# Patient Record
Sex: MALE | Race: WHITE | NOT HISPANIC OR LATINO | Employment: FULL TIME | URBAN - NONMETROPOLITAN AREA
[De-identification: names, ages, dates, MRNs, and addresses within clinical notes are randomized per-mention and may not be internally consistent; named-entity substitution may affect disease eponyms.]

---

## 2023-12-28 ENCOUNTER — OFFICE VISIT (OUTPATIENT)
Dept: FAMILY MEDICINE CLINIC | Facility: CLINIC | Age: 43
End: 2023-12-28
Payer: COMMERCIAL

## 2023-12-28 VITALS
HEART RATE: 82 BPM | HEIGHT: 67 IN | RESPIRATION RATE: 16 BRPM | WEIGHT: 201.6 LBS | SYSTOLIC BLOOD PRESSURE: 132 MMHG | DIASTOLIC BLOOD PRESSURE: 82 MMHG | TEMPERATURE: 98 F | OXYGEN SATURATION: 98 % | BODY MASS INDEX: 31.64 KG/M2

## 2023-12-28 DIAGNOSIS — E66.09 CLASS 1 OBESITY DUE TO EXCESS CALORIES WITHOUT SERIOUS COMORBIDITY WITH BODY MASS INDEX (BMI) OF 31.0 TO 31.9 IN ADULT: ICD-10-CM

## 2023-12-28 DIAGNOSIS — Z00.00 WELL ADULT EXAM: Primary | ICD-10-CM

## 2023-12-28 DIAGNOSIS — Z28.21 VACCINATION DECLINED: ICD-10-CM

## 2023-12-28 DIAGNOSIS — R10.9 LEFT FLANK PAIN: ICD-10-CM

## 2023-12-28 DIAGNOSIS — Z11.59 NEED FOR HEPATITIS C SCREENING TEST: ICD-10-CM

## 2023-12-28 NOTE — PROGRESS NOTES
Male Physical Note      Date: 2023   Patient Name: Justo Baca  : 1980   MRN: [id number]     Chief Complaint:    Chief Complaint   Patient presents with    Establish Care     Pt has not had a PCP since his pediatrician. Dental is up to date vision is not.     Back Pain     Pt reports lower back pain  on the left side mainly in the morning. Radiates to the front abdomen and causes nausea.        History of Present Illness: Justo Baca is a 43 y.o. male who is here today for their annual health maintenance and physical.  Also reports flank pain that radiates to abdomen.  Occurs intermittently.  Stabbing pain.  Denies dysuria, hematuria, urinary discharge, dyspareunia, constipation,melena, hematochezia, fever or chills.  Denies any trauma or injury.  No history of renal calculi.  He does drink mostly soda and very little water.    Subjective      I have reviewed the following portions of the patient's history and these were updated and discussed with the patient as appropriate: past family history, past medical history, past social history, past surgical history, and problem list.      Medications:   No current outpatient medications on file.    Allergies:   No Known Allergies    Immunizations:    There is no immunization history on file for this patient.     Hep C ( 3676-3402): No     Diet/Physical activity: Poor per patient.  Eats lots of fast food.  Does not drink very much water.  Little exercise outside of work.    PHQ-9 Depression Screening  Little interest or pleasure in doing things? 0-->not at all   Feeling down, depressed, or hopeless? 0-->not at all   Trouble falling or staying asleep, or sleeping too much?     Feeling tired or having little energy?     Poor appetite or overeating?     Feeling bad about yourself - or that you are a failure or have let yourself or your family down?     Trouble concentrating on things, such as reading the newspaper or watching television?    "  Moving or speaking so slowly that other people could have noticed? Or the opposite - being so fidgety or restless that you have been moving around a lot more than usual?     Thoughts that you would be better off dead, or of hurting yourself in some way?     PHQ-9 Total Score 0   If you checked off any problems, how difficult have these problems made it for you to do your work, take care of things at home, or get along with other people?       Objective     Physical Exam:  Vital Signs:   Vitals:    12/28/23 1050   BP: 132/82   Pulse: 82   Resp: 16   Temp: 98 °F (36.7 °C)   TempSrc: Temporal   SpO2: 98%   Weight: 91.4 kg (201 lb 9.6 oz)   Height: 170.2 cm (67\")     Body mass index is 31.58 kg/m².     Physical Exam  Vitals reviewed.   Constitutional:       General: He is not in acute distress.     Appearance: Normal appearance. He is not ill-appearing.   HENT:      Head: Normocephalic and atraumatic.      Right Ear: Tympanic membrane, ear canal and external ear normal.      Left Ear: Tympanic membrane, ear canal and external ear normal.      Nose: Nose normal. No congestion or rhinorrhea.      Mouth/Throat:      Mouth: Mucous membranes are moist.      Pharynx: No oropharyngeal exudate or posterior oropharyngeal erythema.   Eyes:      General: No scleral icterus.        Right eye: No discharge.         Left eye: No discharge.      Extraocular Movements: Extraocular movements intact.      Conjunctiva/sclera: Conjunctivae normal.   Cardiovascular:      Rate and Rhythm: Normal rate and regular rhythm.      Heart sounds: Normal heart sounds. No murmur heard.  Pulmonary:      Effort: Pulmonary effort is normal. No respiratory distress.      Breath sounds: Normal breath sounds. No stridor. No wheezing or rales.   Abdominal:      General: Bowel sounds are normal. There is no distension.      Palpations: Abdomen is soft. There is no mass.      Tenderness: There is no abdominal tenderness. There is left CVA tenderness. There " is no right CVA tenderness, guarding or rebound.      Hernia: No hernia is present.   Musculoskeletal:         General: Normal range of motion.      Cervical back: Normal range of motion.      Right lower leg: No edema.      Left lower leg: No edema.   Skin:     General: Skin is warm and dry.   Neurological:      Mental Status: He is alert and oriented to person, place, and time. Mental status is at baseline.   Psychiatric:         Mood and Affect: Mood normal.         Behavior: Behavior normal.         Thought Content: Thought content normal.         Judgment: Judgment normal.         Procedures    Assessment / Plan      Assessment/Plan:   Diagnoses and all orders for this visit:    1. Well adult exam (Primary)  -     CBC (No Diff)  -     Comprehensive Metabolic Panel  -     Lipid Panel    2. Vaccination declined    3. Need for hepatitis C screening test  -     Hepatitis C Antibody    4. Class 1 obesity due to excess calories without serious comorbidity with body mass index (BMI) of 31.0 to 31.9 in adult    5. Left flank pain  -     CT Abdomen Pelvis With & Without Contrast; Future    Currently, he does not smoke or use tobacco    Reviewed care gaps and recommended screening tests with patient.  Patient needs Hepatitis C screening, ordered today  Immunizations reviewed.  Declines flu vaccine today  Reviewed chronic conditions.  Patient following with appropriate specialists, if needed, with follow-up appointments scheduled.  Vital signs demonstrate hemodynamic stability  I suspect that flank pain is secondary to renal calculi.  Will check CT abdomen pelvis.  Consider Flomax if positive.  Pain mostly relieved with OTC analgesics at this time, but would consider opioid analgesia if pain worsens.  Advised patient to drink lots of water and to strain urine    BMI is >= 30 and <35. (Class 1 Obesity). The following options were offered after discussion;: Information on healthy weight added to patient's after visit  summary.       Follow Up:   Return in about 2 weeks (around 1/11/2024) for Flank pain.    Healthcare Maintenance:   Counseling provided on exercise, nutrition, age-appropriate screening test, and appropriate lab studies.   Justo Baca voices understanding and acceptance of this advice and will call back with any further questions or concerns. AVS with preventive healthcare tips printed for patient.     Colton Gil MD   [facility] [place] [facility]

## 2023-12-29 ENCOUNTER — HOSPITAL ENCOUNTER (OUTPATIENT)
Dept: CT IMAGING | Facility: HOSPITAL | Age: 43
Discharge: HOME | End: 2023-12-29
Admitting: FAMILY MEDICINE
Payer: COMMERCIAL

## 2023-12-29 ENCOUNTER — PATIENT ROUNDING (BHMG ONLY) (OUTPATIENT)
Dept: FAMILY MEDICINE CLINIC | Facility: CLINIC | Age: 43
End: 2023-12-29
Payer: COMMERCIAL

## 2023-12-29 DIAGNOSIS — R10.9 LEFT FLANK PAIN: ICD-10-CM

## 2023-12-29 LAB
ALBUMIN SERPL-MCNC: 4.4 G/DL (ref 4.1–5.1)
ALBUMIN/GLOB SERPL: 2.1 {RATIO} (ref 1.2–2.2)
ALP SERPL-CCNC: 53 IU/L (ref 44–121)
ALT SERPL-CCNC: 29 IU/L (ref 0–44)
AST SERPL-CCNC: 21 IU/L (ref 0–40)
BILIRUB SERPL-MCNC: 0.4 MG/DL (ref 0–1.2)
BUN SERPL-MCNC: 9 MG/DL (ref 6–24)
BUN/CREAT SERPL: 9 (ref 9–20)
CALCIUM SERPL-MCNC: 9.1 MG/DL (ref 8.7–10.2)
CHLORIDE SERPL-SCNC: 104 MMOL/L (ref 96–106)
CHOLEST SERPL-MCNC: 234 MG/DL (ref 100–199)
CO2 SERPL-SCNC: 23 MMOL/L (ref 20–29)
CREAT SERPL-MCNC: 0.95 MG/DL (ref 0.76–1.27)
EGFRCR SERPLBLD CKD-EPI 2021: 103 ML/MIN/1.73
ERYTHROCYTE [DISTWIDTH] IN BLOOD BY AUTOMATED COUNT: 12.7 % (ref 11.6–15.4)
GLOBULIN SER CALC-MCNC: 2.1 G/DL (ref 1.5–4.5)
GLUCOSE SERPL-MCNC: 114 MG/DL (ref 70–99)
HCT VFR BLD AUTO: 45.5 % (ref 37.5–51)
HCV IGG SERPL QL IA: NON REACTIVE
HDLC SERPL-MCNC: 36 MG/DL
HGB BLD-MCNC: 15.3 G/DL (ref 13–17.7)
LDLC SERPL CALC-MCNC: 145 MG/DL (ref 0–99)
MCH RBC QN AUTO: 30.3 PG (ref 26.6–33)
MCHC RBC AUTO-ENTMCNC: 33.6 G/DL (ref 31.5–35.7)
MCV RBC AUTO: 90 FL (ref 79–97)
PLATELET # BLD AUTO: 294 X10E3/UL (ref 150–450)
POTASSIUM SERPL-SCNC: 4.1 MMOL/L (ref 3.5–5.2)
PROT SERPL-MCNC: 6.5 G/DL (ref 6–8.5)
RBC # BLD AUTO: 5.05 X10E6/UL (ref 4.14–5.8)
SODIUM SERPL-SCNC: 141 MMOL/L (ref 134–144)
TRIGL SERPL-MCNC: 288 MG/DL (ref 0–149)
VLDLC SERPL CALC-MCNC: 53 MG/DL (ref 5–40)
WBC # BLD AUTO: 6.2 X10E3/UL (ref 3.4–10.8)

## 2023-12-29 PROCEDURE — 74178 CT ABD&PLV WO CNTR FLWD CNTR: CPT

## 2023-12-29 PROCEDURE — 25510000001 IOPAMIDOL 61 % SOLUTION: Performed by: FAMILY MEDICINE

## 2023-12-29 RX ADMIN — IOPAMIDOL 100 ML: 612 INJECTION, SOLUTION INTRAVENOUS at 18:10

## 2024-01-02 ENCOUNTER — TELEPHONE (OUTPATIENT)
Dept: FAMILY MEDICINE CLINIC | Facility: CLINIC | Age: 44
End: 2024-01-02

## 2024-01-02 NOTE — TELEPHONE ENCOUNTER
"  Caller: Justo Baca \"Juan Pablo\"    Relationship: Self    Best call back number:  [phone]     Who are you requesting to speak with (clinical staff, provider,  specific staff member): DR PEREZ OR CLINICAL      What was the call regarding: CT AND LAB RESULTS. PATIENT IS CONCERNED ABOUT NEXT STEPS OF THE KIDNEY STONE. PLEASE ADVISE ASAP.  THANK YOU.        "

## 2024-01-03 DIAGNOSIS — N20.0 RENAL CALCULI: Primary | ICD-10-CM

## 2024-01-03 DIAGNOSIS — R10.9 LEFT FLANK PAIN: ICD-10-CM

## 2024-01-10 ENCOUNTER — OFFICE VISIT (OUTPATIENT)
Dept: UROLOGY | Facility: CLINIC | Age: 44
End: 2024-01-10
Payer: COMMERCIAL

## 2024-01-10 VITALS — BODY MASS INDEX: 31.39 KG/M2 | HEIGHT: 67 IN | OXYGEN SATURATION: 98 % | WEIGHT: 200 LBS | HEART RATE: 80 BPM

## 2024-01-10 DIAGNOSIS — N20.0 NEPHROLITHIASIS: Primary | ICD-10-CM

## 2024-01-10 PROCEDURE — 99204 OFFICE O/P NEW MOD 45 MIN: CPT | Performed by: UROLOGY

## 2024-01-10 PROCEDURE — 87086 URINE CULTURE/COLONY COUNT: CPT | Performed by: UROLOGY

## 2024-01-10 RX ORDER — GABAPENTIN 100 MG/1
600 CAPSULE ORAL ONCE
OUTPATIENT
Start: 2024-01-10 | End: 2024-01-10

## 2024-01-10 RX ORDER — ACETAMINOPHEN 500 MG
1000 TABLET ORAL ONCE
OUTPATIENT
Start: 2024-01-10 | End: 2024-01-10

## 2024-01-10 RX ORDER — SCOLOPAMINE TRANSDERMAL SYSTEM 1 MG/1
1 PATCH, EXTENDED RELEASE TRANSDERMAL CONTINUOUS
OUTPATIENT
Start: 2024-01-10 | End: 2024-01-13

## 2024-01-10 NOTE — PROGRESS NOTES
Office Note Kidney Stone      Patient Name: Justo Baca  : 1980   MRN: [id number]     Chief Complaint: History of Nephrolithiasis/Ureterolithiasis     Referring Provider: Colton Gil MD    History of Present Illness: Justo Baca is a 43 y.o. male who presents today for recently diagnosed kidney stone. He was seen by his PCP for some flank pain on 23 at which time a CT scan was completed showing a 6.5cm left UPJ stone with no additional stones. There is no dilation on scan. He is having intermittent flank pain still.    Stone related history  Family history of stones:   no  Renal disease or anatomic abnormality: no  Malabsorptive disease or gastric bypass: no  Frequent UTI's    no  Parathyroid disease    no        Subjective      Review of System: Review of Systems   Genitourinary:  Negative for decreased urine volume, difficulty urinating, dysuria, enuresis, flank pain, frequency, hematuria and urgency.        I have reviewed the ROS documented by my clinical staff, updated as appropriate and I agree. Saúl Vital MD    Past Medical History:   Past Medical History:   Diagnosis Date    Visual impairment 5 years ago       Past Surgical History:   Past Surgical History:   Procedure Laterality Date    LEG SURGERY Right        Family History:   Family History   Problem Relation Age of Onset    Arthritis Mother     Diabetes Mother     No Known Problems Father     Arthritis Maternal Grandmother     Cancer Maternal Grandmother     Heart disease Maternal Grandmother     Hyperlipidemia Maternal Grandmother     Kidney disease Maternal Grandmother        Social History:   Social History     Socioeconomic History    Marital status:    Tobacco Use    Smoking status: Never     Passive exposure: Never    Smokeless tobacco: Never   Vaping Use    Vaping Use: Never used   Substance and Sexual Activity    Alcohol use: Never    Drug use: Never    Sexual activity: Yes     Partners: Female     " Birth control/protection: Condom       Medications:   No current outpatient medications on file.    Allergies:   No Known Allergies    Objective     Physical Exam:   Vital Signs:   Vitals:    01/10/24 1401   Pulse: 80   SpO2: 98%   Weight: 90.7 kg (200 lb)   Height: 170.2 cm (67.01\")   PainSc: 0-No pain     Body mass index is 31.32 kg/m².     Physical Exam  Vitals and nursing note reviewed.   Constitutional:       Appearance: Normal appearance.   HENT:      Head: Normocephalic and atraumatic.   Cardiovascular:      Comments: Well perfused  Pulmonary:      Effort: Pulmonary effort is normal.   Abdominal:      General: Abdomen is flat.      Palpations: Abdomen is soft.   Musculoskeletal:         General: Normal range of motion.   Skin:     General: Skin is warm and dry.   Neurological:      General: No focal deficit present.      Mental Status: He is alert and oriented to person, place, and time. Mental status is at baseline.   Psychiatric:         Mood and Affect: Mood normal.         Behavior: Behavior normal.         Thought Content: Thought content normal.         Judgment: Judgment normal.         Labs:   Brief Urine Lab Results       None                 Lab Results   Component Value Date    GLUCOSE 114 (H) 12/28/2023    CALCIUM 9.1 12/28/2023     12/28/2023    K 4.1 12/28/2023    CO2 23 12/28/2023     12/28/2023    BUN 9 12/28/2023    CREATININE 0.95 12/28/2023    BCR 9 12/28/2023       Lab Results   Component Value Date    WBC 6.2 12/28/2023    HGB 15.3 12/28/2023    HCT 45.5 12/28/2023    MCV 90 12/28/2023     12/28/2023         Images:   CT Abdomen Pelvis With & Without Contrast    Result Date: 12/29/2023  Nonobstructing stone in the left renal pelvis.  No acute disease. Authenticated and Electronically Signed by Grey Odell M.D. on 12/29/2023 08:54:58 PM      Measures:   Tobacco:   Justo Barnarddridge  reports that he has never smoked. He has never been exposed to tobacco smoke. He has " never used smokeless tobacco.    Assessment / Plan      Assessment/Plan:   Justo Baca is a 43 y.o. male who presented today with nephrolithiasis. He is having intermittent flank pain still. He is now interested in a procedure. We discussed ESWL vs ureteroscopy, and the risks and benefits of each. He is leaning towards ESWL, so we will get him scheduled for this and transition to ureteroscopy if he changes his mind. We collected a urine culture today in case he would like to change to a ureteroscopy.    Diagnoses and all orders for this visit:    1. Nephrolithiasis (Primary)  -     acetaminophen (TYLENOL) tablet 1,000 mg  -     gabapentin (NEURONTIN) capsule 600 mg  -     scopolamine patch 1 mg/72 hr  -     Case Request; Standing  -     ceFAZolin (ANCEF) 2,000 mg in sodium chloride 0.9 % 100 mL IVPB  -     Case Request  -     Urine Culture - Urine, Urine, Clean Catch    Other orders  -     Follow Anesthesia Guidelines / Protocol; Standing  -     Provide Patient With Enhanced Recovery Booklet(s) OR Handout; Standing  -     Verify NPO Status; Standing  -     Verify the Time Patient Completed Gatorade / G2; Standing  -     Obtain Informed Consent; Standing           I spent approximately 45 minutes providing clinical care for this patient; including review of patient's chart and provider documentation, face to face time spent with patient in examination room (obtaining history, performing physical exam, discussing diagnosis and management options), placing orders, and completing patient documentation.     Saúl Vital MD  [facility] [place]

## 2024-01-11 LAB — BACTERIA SPEC AEROBE CULT: NO GROWTH

## 2024-01-15 ENCOUNTER — OFFICE VISIT (OUTPATIENT)
Dept: FAMILY MEDICINE CLINIC | Facility: CLINIC | Age: 44
End: 2024-01-15
Payer: COMMERCIAL

## 2024-01-15 VITALS
BODY MASS INDEX: 32.18 KG/M2 | WEIGHT: 205 LBS | OXYGEN SATURATION: 97 % | HEART RATE: 79 BPM | HEIGHT: 67 IN | DIASTOLIC BLOOD PRESSURE: 82 MMHG | SYSTOLIC BLOOD PRESSURE: 120 MMHG

## 2024-01-15 DIAGNOSIS — E66.09 CLASS 1 OBESITY DUE TO EXCESS CALORIES WITHOUT SERIOUS COMORBIDITY WITH BODY MASS INDEX (BMI) OF 31.0 TO 31.9 IN ADULT: ICD-10-CM

## 2024-01-15 DIAGNOSIS — R73.9 HYPERGLYCEMIA: ICD-10-CM

## 2024-01-15 DIAGNOSIS — E78.2 MIXED HYPERLIPIDEMIA: Primary | ICD-10-CM

## 2024-01-15 PROCEDURE — 99213 OFFICE O/P EST LOW 20 MIN: CPT | Performed by: FAMILY MEDICINE

## 2024-01-15 NOTE — PROGRESS NOTES
"    Office Note     Name: Justo Baca  : 1980   MRN: [id number]     Chief Complaint:  Flank Pain (2 week follow up for flank pain. Patient states that he has not had any issues, but did see urology and is scheduled for surgery on 24. Would like to discuss getting blood work redone.)    Subjective     History of Present Illness:  Justo Baca is a 43 y.o. male who presents today for flank pain f/u and to discuss recent labwork.     Patient had lipid panel and annual exam and lab work.  Lipid panel demonstrated dyslipidemia and CMP demonstrated hyperglycemia.  Patient does have some family history of early CAD on his grandmother side.  He also has history of diabetes in his mother and brother.  Denies urinary frequency or urgency at this time.  He did follow-up with urology after CT showed 6.5 mm stone.  Plan per patient is for procedure in 2 weeks.    I have reviewed CMP as part of the following portions of the patient's history and these were updated and discussed with the patient as appropriate: past family history, past medical history, past social history, past surgical history, and problem list.     Objective     Vital Signs  /82   Pulse 79   Ht 170.2 cm (67.01\")   Wt 93 kg (205 lb)   SpO2 97%   BMI 32.10 kg/m²   Estimated body mass index is 32.1 kg/m² as calculated from the following:    Height as of this encounter: 170.2 cm (67.01\").    Weight as of this encounter: 93 kg (205 lb).    Physical Exam  Vitals reviewed.   Constitutional:       General: He is not in acute distress.     Appearance: Normal appearance. He is obese. He is not ill-appearing.   HENT:      Head: Normocephalic.   Eyes:      Extraocular Movements: Extraocular movements intact.   Cardiovascular:      Rate and Rhythm: Normal rate.   Pulmonary:      Effort: Pulmonary effort is normal. No respiratory distress.      Breath sounds: Normal breath sounds.   Neurological:      Mental Status: He is alert and " oriented to person, place, and time.   Psychiatric:         Mood and Affect: Mood normal.         Behavior: Behavior normal.                      Assessment and Plan     Diagnoses and all orders for this visit:    1. Mixed hyperlipidemia (Primary)  -     Lipid panel; Future    2. Hyperglycemia  -     Basic metabolic panel; Future    3. Class 1 obesity due to excess calories without serious comorbidity with body mass index (BMI) of 31.0 to 31.9 in adult      ASCVD risk is low at 2.4%. Statin medication not indicated. TG level not high enough to warrant immediate medication management either. Will start lifestyle modifications  Discussed dietary changes including low-carb diet.  Discussed increasing exercise.   Will recheck fasting lipid panel and BMP in 3 months.    BMI is >= 30 and <35. (Class 1 Obesity). The following options were offered after discussion;: exercise counseling/recommendations and nutrition counseling/recommendations         Discussion Summary:     Discussed plan of care in detail with patient today. Patient was encouraged to keep me informed of any acute changes, lack of improvement, or any new concerning symptoms.  Patient is also aware of reasons to seek emergent care. Patient verbalized understanding and agrees with plan of care.    This visit was billed based on [facility].    Follow Up  Return in about 3 months (around 4/15/2024) for Recheck hyperlipidemia.    Please note that portions of this note may have been completed with a voice recognition program.     Colton Gil MD, MPH  [facility] [place] [facility] [place]

## 2024-01-26 RX ORDER — IBUPROFEN 200 MG
400 TABLET ORAL EVERY 6 HOURS PRN
COMMUNITY

## 2024-01-26 RX ORDER — LOPERAMIDE HYDROCHLORIDE 2 MG/1
2 CAPSULE ORAL 4 TIMES DAILY PRN
COMMUNITY

## 2024-01-28 ENCOUNTER — ANESTHESIA EVENT (OUTPATIENT)
Dept: PERIOP | Facility: HOSPITAL | Age: 44
End: 2024-01-28
Payer: COMMERCIAL

## 2024-01-28 RX ORDER — SODIUM CHLORIDE 0.9 % (FLUSH) 0.9 %
10 SYRINGE (ML) INJECTION EVERY 12 HOURS SCHEDULED
Status: CANCELLED | OUTPATIENT
Start: 2024-01-28

## 2024-01-28 RX ORDER — SODIUM CHLORIDE 9 MG/ML
40 INJECTION, SOLUTION INTRAVENOUS AS NEEDED
Status: CANCELLED | OUTPATIENT
Start: 2024-01-28

## 2024-01-29 ENCOUNTER — TELEPHONE (OUTPATIENT)
Dept: UROLOGY | Facility: CLINIC | Age: 44
End: 2024-01-29

## 2024-01-29 ENCOUNTER — ANESTHESIA (OUTPATIENT)
Dept: PERIOP | Facility: HOSPITAL | Age: 44
End: 2024-01-29
Payer: COMMERCIAL

## 2024-01-29 ENCOUNTER — HOSPITAL ENCOUNTER (OUTPATIENT)
Facility: HOSPITAL | Age: 44
Setting detail: HOSPITAL OUTPATIENT SURGERY
Discharge: HOME | End: 2024-01-29
Attending: UROLOGY | Admitting: UROLOGY
Payer: COMMERCIAL

## 2024-01-29 VITALS
HEIGHT: 67 IN | SYSTOLIC BLOOD PRESSURE: 125 MMHG | HEART RATE: 74 BPM | DIASTOLIC BLOOD PRESSURE: 81 MMHG | WEIGHT: 202 LBS | RESPIRATION RATE: 16 BRPM | TEMPERATURE: 98.5 F | OXYGEN SATURATION: 95 % | BODY MASS INDEX: 31.71 KG/M2

## 2024-01-29 DIAGNOSIS — N20.0 NEPHROLITHIASIS: ICD-10-CM

## 2024-01-29 PROCEDURE — 50590 FRAGMENTING OF KIDNEY STONE: CPT | Performed by: UROLOGY

## 2024-01-29 PROCEDURE — 25010000002 FENTANYL CITRATE (PF) 100 MCG/2ML SOLUTION

## 2024-01-29 PROCEDURE — 25810000003 LACTATED RINGERS PER 1000 ML: Performed by: ANESTHESIOLOGY

## 2024-01-29 PROCEDURE — 25010000002 ONDANSETRON PER 1 MG

## 2024-01-29 PROCEDURE — 25010000002 PROPOFOL 10 MG/ML EMULSION

## 2024-01-29 PROCEDURE — 25010000002 DEXAMETHASONE PER 1 MG

## 2024-01-29 PROCEDURE — 25010000002 CEFAZOLIN PER 500 MG: Performed by: UROLOGY

## 2024-01-29 RX ORDER — FAMOTIDINE 10 MG/ML
20 INJECTION, SOLUTION INTRAVENOUS ONCE
Status: DISCONTINUED | OUTPATIENT
Start: 2024-01-29 | End: 2024-01-29 | Stop reason: HOSPADM

## 2024-01-29 RX ORDER — HYDROMORPHONE HYDROCHLORIDE 1 MG/ML
0.5 INJECTION, SOLUTION INTRAMUSCULAR; INTRAVENOUS; SUBCUTANEOUS
Status: DISCONTINUED | OUTPATIENT
Start: 2024-01-29 | End: 2024-01-29 | Stop reason: HOSPADM

## 2024-01-29 RX ORDER — METHOCARBAMOL 500 MG/1
500 TABLET, FILM COATED ORAL 3 TIMES DAILY
Qty: 15 TABLET | Refills: 0 | Status: SHIPPED | OUTPATIENT
Start: 2024-01-29

## 2024-01-29 RX ORDER — SODIUM CHLORIDE 0.9 % (FLUSH) 0.9 %
10 SYRINGE (ML) INJECTION AS NEEDED
Status: DISCONTINUED | OUTPATIENT
Start: 2024-01-29 | End: 2024-01-29 | Stop reason: HOSPADM

## 2024-01-29 RX ORDER — OXYBUTYNIN CHLORIDE 5 MG/1
5 TABLET, EXTENDED RELEASE ORAL DAILY
Qty: 14 TABLET | Refills: 0 | Status: SHIPPED | OUTPATIENT
Start: 2024-01-29

## 2024-01-29 RX ORDER — SODIUM CHLORIDE, SODIUM LACTATE, POTASSIUM CHLORIDE, CALCIUM CHLORIDE 600; 310; 30; 20 MG/100ML; MG/100ML; MG/100ML; MG/100ML
9 INJECTION, SOLUTION INTRAVENOUS CONTINUOUS
Status: DISCONTINUED | OUTPATIENT
Start: 2024-01-29 | End: 2024-01-29 | Stop reason: HOSPADM

## 2024-01-29 RX ORDER — MIDAZOLAM HYDROCHLORIDE 1 MG/ML
1 INJECTION INTRAMUSCULAR; INTRAVENOUS
Status: DISCONTINUED | OUTPATIENT
Start: 2024-01-29 | End: 2024-01-29 | Stop reason: HOSPADM

## 2024-01-29 RX ORDER — LIDOCAINE HYDROCHLORIDE 10 MG/ML
0.5 INJECTION, SOLUTION EPIDURAL; INFILTRATION; INTRACAUDAL; PERINEURAL ONCE AS NEEDED
Status: COMPLETED | OUTPATIENT
Start: 2024-01-29 | End: 2024-01-29

## 2024-01-29 RX ORDER — LIDOCAINE HYDROCHLORIDE 10 MG/ML
INJECTION, SOLUTION EPIDURAL; INFILTRATION; INTRACAUDAL; PERINEURAL AS NEEDED
Status: DISCONTINUED | OUTPATIENT
Start: 2024-01-29 | End: 2024-01-29 | Stop reason: SURG

## 2024-01-29 RX ORDER — GABAPENTIN 300 MG/1
600 CAPSULE ORAL ONCE
Status: COMPLETED | OUTPATIENT
Start: 2024-01-29 | End: 2024-01-29

## 2024-01-29 RX ORDER — FENTANYL CITRATE 50 UG/ML
50 INJECTION, SOLUTION INTRAMUSCULAR; INTRAVENOUS
Status: DISCONTINUED | OUTPATIENT
Start: 2024-01-29 | End: 2024-01-29 | Stop reason: HOSPADM

## 2024-01-29 RX ORDER — TAMSULOSIN HYDROCHLORIDE 0.4 MG/1
1 CAPSULE ORAL DAILY
Qty: 14 CAPSULE | Refills: 0 | Status: SHIPPED | OUTPATIENT
Start: 2024-01-29 | End: 2024-02-12

## 2024-01-29 RX ORDER — KETOROLAC TROMETHAMINE 10 MG/1
10 TABLET, FILM COATED ORAL EVERY 6 HOURS PRN
Qty: 15 TABLET | Refills: 0 | Status: SHIPPED | OUTPATIENT
Start: 2024-01-29

## 2024-01-29 RX ORDER — FAMOTIDINE 20 MG/1
20 TABLET, FILM COATED ORAL ONCE
Status: COMPLETED | OUTPATIENT
Start: 2024-01-29 | End: 2024-01-29

## 2024-01-29 RX ORDER — NITROFURANTOIN 25; 75 MG/1; MG/1
100 CAPSULE ORAL 2 TIMES DAILY
Qty: 14 CAPSULE | Refills: 0 | Status: SHIPPED | OUTPATIENT
Start: 2024-01-29

## 2024-01-29 RX ORDER — PHENAZOPYRIDINE HYDROCHLORIDE 200 MG/1
200 TABLET, FILM COATED ORAL 3 TIMES DAILY PRN
Qty: 20 TABLET | Refills: 0 | Status: SHIPPED | OUTPATIENT
Start: 2024-01-29

## 2024-01-29 RX ORDER — ONDANSETRON 2 MG/ML
INJECTION INTRAMUSCULAR; INTRAVENOUS AS NEEDED
Status: DISCONTINUED | OUTPATIENT
Start: 2024-01-29 | End: 2024-01-29 | Stop reason: SURG

## 2024-01-29 RX ORDER — PROPOFOL 10 MG/ML
VIAL (ML) INTRAVENOUS AS NEEDED
Status: DISCONTINUED | OUTPATIENT
Start: 2024-01-29 | End: 2024-01-29 | Stop reason: SURG

## 2024-01-29 RX ORDER — SCOLOPAMINE TRANSDERMAL SYSTEM 1 MG/1
1 PATCH, EXTENDED RELEASE TRANSDERMAL CONTINUOUS
Status: DISCONTINUED | OUTPATIENT
Start: 2024-01-29 | End: 2024-01-29 | Stop reason: HOSPADM

## 2024-01-29 RX ORDER — FENTANYL CITRATE 50 UG/ML
INJECTION, SOLUTION INTRAMUSCULAR; INTRAVENOUS AS NEEDED
Status: DISCONTINUED | OUTPATIENT
Start: 2024-01-29 | End: 2024-01-29 | Stop reason: SURG

## 2024-01-29 RX ORDER — DEXAMETHASONE SODIUM PHOSPHATE 4 MG/ML
INJECTION, SOLUTION INTRA-ARTICULAR; INTRALESIONAL; INTRAMUSCULAR; INTRAVENOUS; SOFT TISSUE AS NEEDED
Status: DISCONTINUED | OUTPATIENT
Start: 2024-01-29 | End: 2024-01-29 | Stop reason: SURG

## 2024-01-29 RX ORDER — ACETAMINOPHEN 500 MG
1000 TABLET ORAL ONCE
Status: COMPLETED | OUTPATIENT
Start: 2024-01-29 | End: 2024-01-29

## 2024-01-29 RX ADMIN — FENTANYL CITRATE 25 MCG: 50 INJECTION, SOLUTION INTRAMUSCULAR; INTRAVENOUS at 12:36

## 2024-01-29 RX ADMIN — LIDOCAINE HYDROCHLORIDE 0.5 ML: 10 INJECTION, SOLUTION EPIDURAL; INFILTRATION; INTRACAUDAL; PERINEURAL at 12:04

## 2024-01-29 RX ADMIN — SODIUM CHLORIDE 2000 MG: 900 INJECTION INTRAVENOUS at 12:39

## 2024-01-29 RX ADMIN — FAMOTIDINE 20 MG: 20 TABLET ORAL at 12:04

## 2024-01-29 RX ADMIN — SODIUM CHLORIDE, POTASSIUM CHLORIDE, SODIUM LACTATE AND CALCIUM CHLORIDE 9 ML/HR: 600; 310; 30; 20 INJECTION, SOLUTION INTRAVENOUS at 12:04

## 2024-01-29 RX ADMIN — ACETAMINOPHEN 1000 MG: 500 TABLET ORAL at 12:04

## 2024-01-29 RX ADMIN — LIDOCAINE HYDROCHLORIDE 50 MG: 10 SOLUTION INTRAVENOUS at 12:36

## 2024-01-29 RX ADMIN — ONDANSETRON 4 MG: 2 INJECTION INTRAMUSCULAR; INTRAVENOUS at 12:42

## 2024-01-29 RX ADMIN — DEXAMETHASONE SODIUM PHOSPHATE 4 MG: 4 INJECTION, SOLUTION INTRAMUSCULAR; INTRAVENOUS at 12:42

## 2024-01-29 RX ADMIN — SCOPOLAMINE 1 PATCH: 1.5 PATCH, EXTENDED RELEASE TRANSDERMAL at 12:04

## 2024-01-29 RX ADMIN — FENTANYL CITRATE 75 MCG: 50 INJECTION, SOLUTION INTRAMUSCULAR; INTRAVENOUS at 12:38

## 2024-01-29 RX ADMIN — GABAPENTIN 600 MG: 300 CAPSULE ORAL at 12:04

## 2024-01-29 RX ADMIN — PROPOFOL 200 MG: 10 INJECTION, EMULSION INTRAVENOUS at 12:36

## 2024-01-29 NOTE — TELEPHONE ENCOUNTER
PT returned call to office, and asked what his return date was on forms. It was noted that he could go back to work the day after his surgery. Because of his job, he spoke to Dr. Vital before procedure, and Dr. Vital said he could return to work when he felt well enough to perform job, so he will call back with that date to be added later and will pay fee then as well.

## 2024-01-29 NOTE — ANESTHESIA PROCEDURE NOTES
Airway  Urgency: elective    Date/Time: 1/29/2024 12:37 PM  Airway not difficult    General Information and Staff    Patient location during procedure: OR  CRNA/CAA: Oscar Zarate CRNA    Indications and Patient Condition  Indications for airway management: airway protection    Preoxygenated: yes  Mask difficulty assessment: 0 - not attempted    Final Airway Details  Final airway type: supraglottic airway      Successful airway: I-gel  Size 4     Number of attempts at approach: 1  Assessment: lips, teeth, and gum same as pre-op    Additional Comments  LMA placed without difficulty, ventilation with assist, equal breath sounds and symmetric chest rise and fall

## 2024-01-29 NOTE — ANESTHESIA PREPROCEDURE EVALUATION
Anesthesia Evaluation     Patient summary reviewed and Nursing notes reviewed   no history of anesthetic complications:   NPO Solid Status: > 8 hours  NPO Liquid Status: > 2 hours           Airway   Mallampati: II  TM distance: >3 FB  Neck ROM: full  No difficulty expected  Dental - normal exam     Pulmonary - negative pulmonary ROS and normal exam    breath sounds clear to auscultation  Cardiovascular - normal exam    ECG reviewed  Rhythm: regular  Rate: normal    (+) hyperlipidemia      Neuro/Psych- negative ROS  GI/Hepatic/Renal/Endo    (+) obesity, renal disease- stones    Musculoskeletal     Abdominal    Substance History      OB/GYN          Other - negative ROS                   Anesthesia Plan    ASA 2     general     intravenous induction     Anesthetic plan, risks, benefits, and alternatives have been provided, discussed and informed consent has been obtained with: patient.    Plan discussed with CRNA.    CODE STATUS:

## 2024-01-29 NOTE — DISCHARGE INSTRUCTIONS
[facility] Post-Operative Care/Expectations    Follow these guidelines after your procedure in order to assist with your recovery.    Anesthesia Precautions and Expectations  - Rest for 24 hours after receiving general anesthesia, make sure you have someone at home with you that can monitor you  - Do not operate a vehicle, drink alcohol, or make 'important decisions'/sign legal documentation during the immediate recovery period if you received sedation for your procedure  - You may experience a sore throat, jaw discomfort, or muscle aches related to anesthesia, these symptoms may last a few days    Activity  - You may resume your normal home activities immediately post-operatively; however, light activity is encouraged for 24 hours to prevent urinary bleeding  - Do not operate a vehicle or drink alcohol if you were prescribed narcotic pain medications     Bathing/Showering  - You may resume normal bathing and showering post-procedure    Pain/Urinary Symptoms  - You may experience burning urinary pain for a few days, and/or increased urinary urgency/frequency post-procedure for a few weeks which is expected (sometimes longer if a ureteral stent was left in place)   - A medication to prevent burning urinary pain (Phenazopyridine) may be prescribed by your doctor, take as directed  - A medication to prevent urinary urgency/frequency (sometimes referred to as “bladder spasms”) (Hyoscyamine, Oxybutynin, Mirabegron, Solifenacin, etc.) may be prescribed by your doctor for up to 1 month, take as directed     Urinary Bleeding (Hematuria)   - Some degree of light urinary bleeding (hematuria) is expected for up to 1-2 weeks (this may be as light as pink lemonade or somewhat darker like clear/pale red Gatorade); a good rule of thumb is that your urine should remain see-through    - If you experience heavy urinary bleeding (like the color and consistency of tomato juice, or red wine), large blood clots, or you are unable to urinate  for more than 8 hours you should contact your doctor and present to the nearest Emergency Department  - Drink plenty of water at home and stay hydrated, as this will help naturally flush out your bladder and urethra    Antibiotics  - Complete the antibiotic course (if) prescribed as directed to prevent urinary infection     When to call your doctor:   - Pain that is not controlled with oral medications  - Signs of significant infection: Fever 101F, shaking chills, profuse sweating, persistent nausea or vomiting, unable to tolerate food or drink   - Severe urinary bleeding or large blood clots in urine  - Inability to urinate for more than 8 hours post-surgery

## 2024-01-29 NOTE — TELEPHONE ENCOUNTER
Called and LVM that Disability paperwork was ready, but $25 fee needed to be collected before we can send paperwork.

## 2024-01-29 NOTE — OP NOTE
EXTRACORPOREAL SHOCKWAVE LITHOTRIPSY  Procedure Report    Patient Name:  Clement Baca  YOB: 1980    Date of Surgery:  1/29/2024     Indications: 44 yo male with 7mm left renal stone who presents for definitive stone treatment via ESWL. Risks discussed, informed consent obtained.     Pre-op Diagnosis:   Nephrolithiasis [N20.0]       Post-Op Diagnosis Codes:     * Nephrolithiasis [N20.0]    Procedure(s):  LEFT EXTRACORPOREAL SHOCKWAVE LITHOTRIPSY    Staff:  Surgeon(s):  Saúl Vital MD    Anesthesia: General    Estimated Blood Loss: none    Implants:    Nothing was implanted during the procedure    Specimen:          None        Findings:   1.   fluoroscopy identifying 7 mm nonobstructing left renal stone.  2.  Successful shockwave lithotripsy of stone.  Conclusion KUB with appropriate demonstration of stone fragmentation.    Complications: None-immediate     Description of Procedure:   After informed consent, the patient was brought back to the operating suite and moved over to the operating table. General anesthesia was smoothly induced, IV antibiotics were administered, and the patient was placed in the dorsal lithotomy position with careful attention focused on padding all pressure points. The patient was prepped and draped in standard fashion. A timeout was performed to ensure the correct patient and procedure.     The patient was seen and examined with the preoperative area.  The risks benefits and alternatives were explained to the patient and informed consent was obtained. hewas taken to the operating room and placed on table in the supine position.  SCD were placed on the bilateral lower extremities.       fluoroscopy fluoroscopy was used to confirm presence and location of stone. 7 mm non-obstructing left stone identified. General anesthesia was then induced without any complication.       The stone was then targeted and shocks were delivered at 60-90 shocks per  minute.  A 2-minute pause was performed after 200 shocks. The energy was slowly ramped up from 1-5 every 100 shocks.  A total of 2500 shocks were delivered.  The stone appeared fragment and had lost density at the end of the case.  The patient tolerated the procedure well and there were no immediate complications to the procedure.  The patient was taken to PACU in stable and extubated condition.          The patient was brought back to the PACU in stable condition. All scopes and instruments were in good working order at the end of the case. There were no complications.        Disposition:  To be discharged remaining appropriate PACU criteria.  Patient to follow-up in 4 weeks with ultrasound.          Saúl Vital MD     Date: 1/29/2024  Time: 13:09 EST

## 2024-01-29 NOTE — TELEPHONE ENCOUNTER
[facility] received a fax that requires your attention. The document has been indexed to the patient's chart for your review.    Reason for sending: DISABILITY  NEEDS ATTENDING PHYSICIAN STATEMENT FILLED OUT AND FAXED BACK    Documents Description: ATTENDING PHYSICIAN STATEMENT_[facility]_01/26/24    Name of Sender: DONNY [facility] Nikole LOPEZ    Date Indexed: 01/29/24

## 2024-01-29 NOTE — INTERVAL H&P NOTE
"[facility] Pre-op    Full history and physical note from office is attached.    /80 (BP Location: Right arm, Patient Position: Lying)   Pulse 82   Temp 97.6 °F (36.4 °C) (Temporal)   Resp 18   Ht 170.2 cm (67\")   Wt 91.6 kg (202 lb)   SpO2 96%   BMI 31.64 kg/m²     LAB Results:  Lab Results   Component Value Date    WBC 6.2 12/28/2023    HGB 15.3 12/28/2023    HCT 45.5 12/28/2023    MCV 90 12/28/2023     12/28/2023    GLUCOSE 114 (H) 12/28/2023    BUN 9 12/28/2023    CREATININE 0.95 12/28/2023     12/28/2023    K 4.1 12/28/2023     12/28/2023    CO2 23 12/28/2023    CALCIUM 9.1 12/28/2023    ALBUMIN 4.4 12/28/2023    AST 21 12/28/2023    ALT 29 12/28/2023    BILITOT 0.4 12/28/2023     Study Result    Narrative & Impression   FINAL REPORT     TECHNIQUE:  Routine axial images were obtained from the lung bases to below  the iliac crest, before and following IV contrast administration.     CLINICAL HISTORY:  Flank pain, kidney stone suspected LEFT FLANK PAIN.     FINDINGS:  Abdomen: There is a 6.5 mm nonobstructing stone in the left  renal pelvis.  The gallbladder, solid abdominal organs and right  ureter are otherwise normal. The GI tract is normal, including  the appendix.  Pelvis: The urinary bladder is normal. There is  no pelvic or abdominal ascites, adenopathy or significant  osseous abnormality.     IMPRESSION:  Nonobstructing stone in the left renal pelvis.  No acute disease.     Cancer Staging (if applicable)  Cancer Patient: __ yes __no __unknown__N/A; If yes, clinical stage T:__ N:__M:__, stage group or __N/A      Impression: Nephrolithiasis       Plan: EXTRACORPOREAL SHOCKWAVE LITHOTRIPSY       Brenda Whitten, SWETHA   1/29/2024   12:22 EST  "

## 2024-01-29 NOTE — ANESTHESIA POSTPROCEDURE EVALUATION
Patient: Clement Baca    Procedure Summary       Date: 01/29/24 Room / Location:  [facility] OR 09 /  [facility] OR    Anesthesia Start: 1229 Anesthesia Stop: 1324    Procedure: EXTRACORPOREAL SHOCKWAVE LITHOTRIPSY (Left) Diagnosis:       Nephrolithiasis      (Nephrolithiasis [N20.0])    Surgeons: Saúl Vital MD Provider: Selvin Souza MD    Anesthesia Type: general ASA Status: 2            Anesthesia Type: general    Vitals  Vitals Value Taken Time   /67 01/29/24 1324   Temp 97 °F (36.1 °C) 01/29/24 1324   Pulse 78 01/29/24 1324   Resp 14 01/29/24 1324   SpO2 94 % 01/29/24 1324           Post Anesthesia Care and Evaluation    Patient location during evaluation: PACU  Patient participation: complete - patient participated  Level of consciousness: awake and alert  Pain score: 0  Pain management: adequate    Airway patency: patent  Anesthetic complications: No anesthetic complications  PONV Status: none  Cardiovascular status: hemodynamically stable and acceptable  Respiratory status: nonlabored ventilation, acceptable and nasal cannula  Hydration status: acceptable

## 2024-01-31 NOTE — TELEPHONE ENCOUNTER
INCOMING CALL FROM PATIENT WANTING TO PAY FOR DISABILITY PAPERWORK ALONG WITH NEEDING TO EXTEND HIS RETURN TO WORK DATE DUE TO THE MEDICATION THAT DR. WALTERS PUT HIM ON. HE IS NOT ABLE TO DRIVE WHILE ON THAT MEDICATION.     PLEASE CONTACT PT

## 2024-01-31 NOTE — TELEPHONE ENCOUNTER
VASILIY SPOKE WITH  AND PER DR WALTERS PATIENT CAN BE OFF WORK AT MOST 1 WEEK. WHILE ALSO TALKING TO PATIENT HE MENTIONED WHAT MEDICATIONS WOULD HE BE ON (HE MENTIONED 6 AND WASN'T SURE IF HE NEEDED 6) AND A RELEASE FROM TO GO BACK TO WORK.

## 2024-02-02 ENCOUNTER — TELEPHONE (OUTPATIENT)
Dept: UROLOGY | Facility: CLINIC | Age: 44
End: 2024-02-02
Payer: COMMERCIAL

## 2024-02-02 NOTE — TELEPHONE ENCOUNTER
"Provider: DR WALTERS    Caller: Clement Baca \"Juan Pablo\"     Relationship to Patient: SELF    Phone Number: [phone]     Reason for Call: PT CALLING TO SEE IF HIS [facility] PAPERWORK HAS BEEN UPDATED. PT STATES HE WAS DELAYED IN RETURNING UNTIL MONDAY.    PT IS A  AT [employer] AND HIS JOB  WILL NOT LET HIM RETURN WITH ANY RESTRICTIONS. 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    PT HAD A QUESTION ABOUT THE tamsulosin (FLOMAX) 0.4 MG capsule 24 hr capsule, THE BOTTLE ADVISES IT CAN CAUSE DIZZINESS AND HAD A WARNING ABOUT OPERATING A VEHICLE OR EQUIPMENT. PT NOTED HE HAS NOT HAD ANY OF THESE SIDE EFFECTS. PT HAS ABOUT 7 DAYS LEFT TO TAKE.    PLEASE GIVE PT A CALL BACK TO [employer] IF PAPERWORK IS READY. PT ASKING FOR A CALL TODAY TO [employer] IF HE WILL BE RETURNING TO WORK ON MONDAY.      "

## 2024-02-02 NOTE — TELEPHONE ENCOUNTER
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 I let him know he will be okay to continue the tamsulosin if he has had no symptoms while currenly on it. He stated understanding. I let him know the  would be giving him a call to get the paperwork payment.

## 2024-03-22 ENCOUNTER — HOSPITAL ENCOUNTER (OUTPATIENT)
Dept: ULTRASOUND IMAGING | Facility: HOSPITAL | Age: 44
Discharge: HOME | End: 2024-03-22
Admitting: UROLOGY
Payer: COMMERCIAL

## 2024-03-22 DIAGNOSIS — N20.0 NEPHROLITHIASIS: ICD-10-CM

## 2024-03-22 PROCEDURE — 76775 US EXAM ABDO BACK WALL LIM: CPT

## 2024-03-26 ENCOUNTER — OFFICE VISIT (OUTPATIENT)
Dept: UROLOGY | Facility: CLINIC | Age: 44
End: 2024-03-26
Payer: COMMERCIAL

## 2024-03-26 VITALS — OXYGEN SATURATION: 97 % | HEIGHT: 67 IN | BODY MASS INDEX: 31.71 KG/M2 | WEIGHT: 202 LBS | HEART RATE: 80 BPM

## 2024-03-26 DIAGNOSIS — N20.0 NEPHROLITHIASIS: Primary | ICD-10-CM

## 2024-03-26 NOTE — PROGRESS NOTES
Office Note Kidney Stone      Patient Name: Celment Baca  : 1980   MRN: [id number]     Chief Complaint: History of Nephrolithiasis/Ureterolithiasis       History of Present Illness: Clement Baca is a 43 y.o. male who presents today for 4-week follow-up after ESWL for management of nonobstructing renal stone.  He is doing well in the postoperative setting.  Denies flank pain, lower urinary tract symptoms.        Subjective      Review of System: Review of Systems   Genitourinary:  Negative for decreased urine volume, difficulty urinating, dysuria, enuresis, flank pain, frequency, hematuria and urgency.        I have reviewed the ROS documented by my clinical staff, updated as appropriate and I agree. Saúl Vital MD    Past Medical History:   Past Medical History:   Diagnosis Date    Kidney stones     Visual impairment 5 years ago       Past Surgical History:   Past Surgical History:   Procedure Laterality Date    EXTRACORPOREAL SHOCK WAVE LITHOTRIPSY (ESWL) Left 2024    Procedure: EXTRACORPOREAL SHOCKWAVE LITHOTRIPSY;  Surgeon: Saúl Vital MD;  Location: [facility];  Service: Urology;  Laterality: Left;    LEG SURGERY Right     reset, fracturex2       Family History:   Family History   Problem Relation Age of Onset    Arthritis Mother     Diabetes Mother     No Known Problems Father     Arthritis Maternal Grandmother     Cancer Maternal Grandmother     Heart disease Maternal Grandmother     Hyperlipidemia Maternal Grandmother     Kidney disease Maternal Grandmother        Social History:   Social History     Socioeconomic History    Marital status:    Tobacco Use    Smoking status: Never     Passive exposure: Never    Smokeless tobacco: Never   Vaping Use    Vaping status: Never Used   Substance and Sexual Activity    Alcohol use: Never    Drug use: Never    Sexual activity: Yes     Partners: Female     Birth control/protection: Condom       Medications:  "    Current Outpatient Medications:     ibuprofen (ADVIL,MOTRIN) 200 MG tablet, Take 2 tablets by mouth Every 6 (Six) Hours As Needed for Mild Pain., Disp: , Rfl:     loperamide (IMODIUM) 2 MG capsule, Take 1 capsule by mouth 4 (Four) Times a Day As Needed for Diarrhea., Disp: , Rfl:     methocarbamol (ROBAXIN) 500 MG tablet, Take 1 tablet by mouth 3 (Three) Times a Day., Disp: 15 tablet, Rfl: 0    ketorolac (TORADOL) 10 MG tablet, Take 1 tablet by mouth Every 6 (Six) Hours As Needed for Moderate Pain. (Patient not taking: Reported on 3/26/2024), Disp: 15 tablet, Rfl: 0    nitrofurantoin, macrocrystal-monohydrate, (Macrobid) 100 MG capsule, Take 1 capsule by mouth 2 (Two) Times a Day. (Patient not taking: Reported on 3/26/2024), Disp: 14 capsule, Rfl: 0    oxybutynin XL (DITROPAN-XL) 5 MG 24 hr tablet, Take 1 tablet by mouth Daily. PRN BLADDER SPASM (Patient not taking: Reported on 3/26/2024), Disp: 14 tablet, Rfl: 0    phenazopyridine (Pyridium) 200 MG tablet, Take 1 tablet by mouth 3 (Three) Times a Day As Needed for Bladder Spasms. (Patient not taking: Reported on 3/26/2024), Disp: 20 tablet, Rfl: 0    Allergies:   No Known Allergies    Objective     Physical Exam:   Vital Signs:   Vitals:    03/26/24 1552   Pulse: 80   SpO2: 97%   Weight: 91.6 kg (202 lb)   Height: 170.2 cm (67.01\")   PainSc: 0-No pain     Body mass index is 31.63 kg/m².     Physical Exam  Vitals and nursing note reviewed.   Constitutional:       Appearance: Normal appearance.   HENT:      Head: Normocephalic and atraumatic.   Cardiovascular:      Comments: Well perfused  Pulmonary:      Effort: Pulmonary effort is normal.   Abdominal:      General: Abdomen is flat.      Palpations: Abdomen is soft.   Musculoskeletal:         General: Normal range of motion.   Skin:     General: Skin is warm and dry.   Neurological:      General: No focal deficit present.      Mental Status: He is alert and oriented to person, place, and time. Mental status is " at baseline.   Psychiatric:         Mood and Affect: Mood normal.         Behavior: Behavior normal.         Thought Content: Thought content normal.         Judgment: Judgment normal.         Labs:   Brief Urine Lab Results       None            Urine Culture          1/10/2024    15:18   Urine Culture   Urine Culture No growth         Lab Results   Component Value Date    GLUCOSE 114 (H) 12/28/2023    CALCIUM 9.1 12/28/2023     12/28/2023    K 4.1 12/28/2023    CO2 23 12/28/2023     12/28/2023    BUN 9 12/28/2023    CREATININE 0.95 12/28/2023    BCR 9 12/28/2023       Lab Results   Component Value Date    WBC 6.2 12/28/2023    HGB 15.3 12/28/2023    HCT 45.5 12/28/2023    MCV 90 12/28/2023     12/28/2023         Images:   US Renal Bilateral    Result Date: 3/22/2024  Unremarkable renal ultrasound.   This report was signed and finalized on 3/22/2024 5:31 PM by Omer Beltrán MD.      CT Abdomen Pelvis With & Without Contrast    Result Date: 12/29/2023  Nonobstructing stone in the left renal pelvis.  No acute disease. Authenticated and Electronically Signed by Grey Odell M.D. on 12/29/2023 08:54:58 PM      Measures:   Tobacco:   Clement Baca  reports that he has never smoked. He has never been exposed to tobacco smoke. He has never used smokeless tobacco. I have educated him on the risk of diseases from using tobacco products.     Assessment / Plan      Assessment/Plan:   Clement Baca is a 43 y.o. male who presented today with nephrolithiasis/ureterolithiasis.  4-week follow-up after ESWL for nonobstructing stone.  Ultrasound with no evidence of hydronephrosis, no evidence of stone burden.  We have discussed conservative strategies to reduce stone risk including increasing fluid intake to greater than 2-2 and half liters, decreasing sodium and animal protein intake, increasing citrate intake.  Will alert with any acute stone symptoms.  Follow-up in 1 year for symptom check,  stone surveillance.    Diagnoses and all orders for this visit:    1. Nephrolithiasis (Primary)  -     US Renal Bilateral; Future             Follow Up:   Return in about 1 year (around 3/26/2025) for Recheck.    I spent approximately 30 minutes providing clinical care for this patient; including review of patient's chart and provider documentation, face to face time spent with patient in examination room (obtaining history, performing physical exam, discussing diagnosis and management options), placing orders, and completing patient documentation.     Saúl Vital MD  [facility] [place]